# Patient Record
Sex: MALE | Race: WHITE | NOT HISPANIC OR LATINO
[De-identification: names, ages, dates, MRNs, and addresses within clinical notes are randomized per-mention and may not be internally consistent; named-entity substitution may affect disease eponyms.]

---

## 2018-06-11 ENCOUNTER — APPOINTMENT (OUTPATIENT)
Dept: RADIOLOGY | Facility: IMAGING CENTER | Age: 63
End: 2018-06-11
Payer: COMMERCIAL

## 2018-06-11 ENCOUNTER — TRANSCRIPTION ENCOUNTER (OUTPATIENT)
Age: 63
End: 2018-06-11

## 2018-06-11 ENCOUNTER — OUTPATIENT (OUTPATIENT)
Dept: OUTPATIENT SERVICES | Facility: HOSPITAL | Age: 63
LOS: 1 days | End: 2018-06-11
Payer: COMMERCIAL

## 2018-06-11 DIAGNOSIS — Z00.8 ENCOUNTER FOR OTHER GENERAL EXAMINATION: ICD-10-CM

## 2018-06-11 PROCEDURE — 73610 X-RAY EXAM OF ANKLE: CPT | Mod: 26,RT

## 2018-06-11 PROCEDURE — 73610 X-RAY EXAM OF ANKLE: CPT

## 2020-04-25 ENCOUNTER — MESSAGE (OUTPATIENT)
Age: 65
End: 2020-04-25

## 2020-05-06 ENCOUNTER — APPOINTMENT (OUTPATIENT)
Dept: DISASTER EMERGENCY | Facility: HOSPITAL | Age: 65
End: 2020-05-06

## 2020-05-07 LAB
SARS-COV-2 IGG SERPL IA-ACNC: <0.1 INDEX
SARS-COV-2 IGG SERPL QL IA: NEGATIVE

## 2022-10-19 ENCOUNTER — NON-APPOINTMENT (OUTPATIENT)
Age: 67
End: 2022-10-19

## 2022-12-14 ENCOUNTER — NON-APPOINTMENT (OUTPATIENT)
Age: 67
End: 2022-12-14

## 2023-01-05 ENCOUNTER — APPOINTMENT (OUTPATIENT)
Dept: PULMONOLOGY | Facility: CLINIC | Age: 68
End: 2023-01-05
Payer: COMMERCIAL

## 2023-01-05 VITALS
WEIGHT: 210 LBS | RESPIRATION RATE: 15 BRPM | SYSTOLIC BLOOD PRESSURE: 184 MMHG | TEMPERATURE: 96 F | BODY MASS INDEX: 31.83 KG/M2 | DIASTOLIC BLOOD PRESSURE: 97 MMHG | HEIGHT: 68 IN | OXYGEN SATURATION: 97 % | HEART RATE: 100 BPM

## 2023-01-05 VITALS — SYSTOLIC BLOOD PRESSURE: 138 MMHG | DIASTOLIC BLOOD PRESSURE: 76 MMHG

## 2023-01-05 DIAGNOSIS — M1A.0510 IDIOPATHIC CHRONIC GOUT, RIGHT HIP, W/OUT TOPHUS (TOPHI): ICD-10-CM

## 2023-01-05 DIAGNOSIS — M10.9 GOUT, UNSPECIFIED: ICD-10-CM

## 2023-01-05 PROCEDURE — 99204 OFFICE O/P NEW MOD 45 MIN: CPT | Mod: GC

## 2023-01-08 NOTE — REVIEW OF SYSTEMS
[EDS: ESS=____] : daytime somnolence: ESS=[unfilled] [Nasal Congestion] : nasal congestion [Snoring] : no snoring [Postnasal Drip] : no postnasal drip [Witnessed Apneas] : no witnessed apnea [Shortness Of Breath] : no shortness of breath [A.M. Dry Mouth] : no a.m. dry mouth [Nocturia] : no nocturia [Difficulty Initiating Sleep] : no difficulty falling asleep [Difficulty Maintaining Sleep] : no difficulty maintaining sleep [Lower Extremity Discomfort] : no lower extremity discomfort [Irresistible urge to move legs] : no irresistible urge to move legs because of lower extremity discomfort [LE discomfort relieved by movement] : lower extremity discomfort not relieved by movement [Late day/ Evening symptoms] : no late day/evening symptoms [Sleep Disturbances due to LE symptoms] : ~T no sleep disturbances due to lower extremity symptoms [Unusual Sleep Behavior] : no unusual sleep behavior [Hypersomnolence] : not sleeping much more than usual [Hypnogogic Hallucinations] : no hypnogogic hallucinations [Hypnopompic Hallucinations] : no hypnopompic hallucinations [Sleep Paralysis] : no sleep paralysis

## 2023-01-08 NOTE — ASSESSMENT
[FreeTextEntry1] : 67M hx of severe LENA (PSG 1/2010 AHI 31, T90 12.7%, currently on CPAP 11 ResMed AirSense 10 Elite with Vitera face mask) who presents as a new patient last seen in clinic in 2015. He reports no issues with the CPAP therapy for his LENA. He reports CPAP has had a tremendously positive impact on his life and that he uses his machine nightly. He goes to sleep at around 22:30 and wakes up at around 0630. No snoring, witnessed apneas, AM headaches, or AM dry mouth. He usually will fall asleep without difficulty and will wake up once per night to urinate. He denies daytime sleepiness and ESS in office today is 3. \par \par Assessment: Excellent response to CPAP therapy. AHI 1.9. Excellent compliance with therapy. Discussed potentially replacing patients CPAP machine including the pros and cons of the new machines and we decided together that the patient would continue using his current machine until it malfunctions. \par \par Plan:\par Will write prescriptions for new mask\par Encouraged patient to contact me if any issues with his current machine\par RTC in 1 year for follow up. Can also schedule televisit instead\par \par Repeat BP check after sitting in room quietly for 15 minutes and with appropriate blood pressure cuff size: 138/76- he will f/u with his primary MD for further bp management.

## 2023-01-08 NOTE — HISTORY OF PRESENT ILLNESS
[FreeTextEntry1] : 67M hx of severe LENA (PSG 1/2010 AHI 31, T90 12.7%, currently on CPAP 11 ResMed AirSense 10 Elite with Vitera face mask) who presents as a new patient last seen in clinic in 2015. He reports no issues with the CPAP therapy for his LENA. He reports CPAP has had a tremendously positive impact on his life and that he uses his machine religiously. He goes to sleep at around 22:30 and wakes up at around 0630. No snoring, witnessed apneas, AM headaches, or AM dry mouth. He usually will fall asleep without difficulty and will wake up once per night to urinate. He denies daytime sleepiness and ESS in office today is 3. \par \par Usage days 87/90 97%\par Average usage todal days 7 hours 11 minutes \par Average usage days used 7 hours 26 minutes\par AHI 1.9\par  \par \par \par \par \par \par \par  [Daytime Somnolence] : no daytime somnolence [ESS] : 3

## 2023-01-08 NOTE — PHYSICAL EXAM
[General Appearance - Well Developed] : well developed [Normal Appearance] : normal appearance [Well Groomed] : well groomed [Normal Conjunctiva] : the conjunctiva exhibited no abnormalities [Normal Oropharynx] : normal oropharynx [Neck Appearance] : the appearance of the neck was normal [Heart Rate And Rhythm] : heart rate was normal and rhythm regular [Heart Sounds] : normal S1 and S2 [Heart Sounds Gallop] : no gallops [Murmurs] : no murmurs [Heart Sounds Pericardial Friction Rub] : no pericardial rub [] : no respiratory distress [Respiration, Rhythm And Depth] : normal respiratory rhythm and effort [Exaggerated Use Of Accessory Muscles For Inspiration] : no accessory muscle use [Auscultation Breath Sounds / Voice Sounds] : lungs were clear to auscultation bilaterally [Abnormal Walk] : normal gait [Nail Clubbing] : no clubbing of the fingernails [Cyanosis, Localized] : no localized cyanosis [Petechial Hemorrhages (___cm)] : no petechial hemorrhages [Nail Splinter Hemorrhages] : no splinter hemorrhages of the nails [Skin Color & Pigmentation] : normal skin color and pigmentation [Oriented To Time, Place, And Person] : oriented to person, place, and time [Impaired Insight] : insight and judgment were intact [Affect] : the affect was normal [Memory Recent] : recent memory was not impaired [Mood] : the mood was normal [Memory Remote] : remote memory was not impaired [FreeTextEntry2] : No edema

## 2023-04-28 ENCOUNTER — APPOINTMENT (OUTPATIENT)
Dept: DERMATOLOGY | Facility: CLINIC | Age: 68
End: 2023-04-28

## 2023-05-31 ENCOUNTER — TRANSCRIPTION ENCOUNTER (OUTPATIENT)
Age: 68
End: 2023-05-31

## 2023-07-26 ENCOUNTER — APPOINTMENT (OUTPATIENT)
Dept: PULMONOLOGY | Facility: CLINIC | Age: 68
End: 2023-07-26

## 2023-08-03 ENCOUNTER — APPOINTMENT (OUTPATIENT)
Dept: PULMONOLOGY | Facility: CLINIC | Age: 68
End: 2023-08-03
Payer: COMMERCIAL

## 2023-08-03 VITALS
TEMPERATURE: 98.6 F | WEIGHT: 214 LBS | HEART RATE: 85 BPM | RESPIRATION RATE: 15 BRPM | BODY MASS INDEX: 32.43 KG/M2 | DIASTOLIC BLOOD PRESSURE: 83 MMHG | HEIGHT: 68 IN | SYSTOLIC BLOOD PRESSURE: 145 MMHG | OXYGEN SATURATION: 94 %

## 2023-08-03 DIAGNOSIS — G47.33 OBSTRUCTIVE SLEEP APNEA (ADULT) (PEDIATRIC): ICD-10-CM

## 2023-08-03 PROCEDURE — 99213 OFFICE O/P EST LOW 20 MIN: CPT

## 2023-08-03 RX ORDER — CHROMIUM 200 MCG
TABLET ORAL
Refills: 0 | Status: ACTIVE | COMMUNITY

## 2023-08-03 RX ORDER — BERBERINE CHLOR/SEAWEED/CHROM 500-250 MG
CAPSULE ORAL
Refills: 0 | Status: ACTIVE | COMMUNITY

## 2023-08-03 NOTE — HISTORY OF PRESENT ILLNESS
[Awakening With Dry Mouth] : awakening with dry mouth [FreeTextEntry1] : 67 year-old male following up after receiving 5 year-old machine replacement for SEVERE LENA (AHI 31.8) on CPAP 11 cmH2O.  Last visit 1/2023.  2010:  SLEEP STUDIES DX PSG 1/28/10:  AHI 31.8.  T<90 12.7%.  Lowest SatO2 71%. Titration 3/16/10:  AHI 7.1 on CPAP 11 cmH2O  2023 TX:  AirSense 11 AutoSet setup 5/26/2023 by [facility], using a full-face mask. COMPLIANCE:  Nightly BENEFIT:  Not drowsy, and sleep throughout the night.  Denies apnea-related symptoms. Occasional dry mouth.  Vitera mask whistles, switched to Mirage Quattro. DATA: 100% use for 7-hrs 50-mins. AHI 3.2 on CPAP 11 cmH2O.  Mask leak 14.2 L/min. SLEEPS:  7-hours between 10pm-5am.  No naps.  SOL instantly. 0-2 brief awakenings to urinate.  No INTERIM CHANGES in health or weight.  Added supplements: Red Yeast Rice, Turmeric, Vitamin D. [Snoring] : no snoring [Witnessed Apneas] : no witnessed sleep apnea [Frequent Nocturnal Awakening] : no nocturnal awakening [Unintentional Sleep while Active] : no unintentional sleep while active [Unintentional Sleep While Inactive] : no unintentional sleep while inactive [Awakes Unrefreshed] : does not awake unrefreshed [Awakes with Headache] : no headache upon awakening [Recent  Weight Gain] : no recent weight gain [Daytime Somnolence] : no daytime somnolence [DIS] : no DIS [DMS] : no DMS [Unusual Sleep Behavior] : no unusual sleep behavior [Lower Extremity Discomfort] : no lower extremity discomfort in evening or at bedtime [ESS] : 3

## 2023-08-03 NOTE — ASSESSMENT
[FreeTextEntry1] : 67 year old HAMILTON FELICIANO   complies well and benefits from CPAP therapy since 2010 for:   	Severe obstructive sleep apnea  (AHI 31.8) on CPAP 11 cmH2O using a full-face mask.  Drowsiness and sleep fragmentation has resolved with CPAP.  [place] Sleepiness Scale score is 3.   AirSense 11 AutoSet downloaded data was reviewed with patient:   Excellent CPAP compliance (100% of days, 100% for >4-hrs, averaging 7-hrs 50-mins.),   excellent therapeutic response (AHI 3.2), acceptable mask leak (14.2 L/min).          [facility] PAP supply renewal to continue therapy at the current settings. Patient will consider an in-office mask fitting. Annual follow-up, sooner if needed.

## 2023-08-03 NOTE — PHYSICAL EXAM
[Normal Appearance] : normal appearance [Well Groomed] : well groomed [General Appearance - In No Acute Distress] : no acute distress [Normal Conjunctiva] : the conjunctiva exhibited no abnormalities [Neck Appearance] : the appearance of the neck was normal [Heart Rate And Rhythm] : heart rate was normal and rhythm regular [Murmurs] : no murmurs [] : no respiratory distress [Auscultation Breath Sounds / Voice Sounds] : lungs were clear to auscultation bilaterally [Involuntary Movements] : no involuntary movements were seen [FreeTextEntry2] : no edema [No Focal Deficits] : no focal deficits [Affect] : the affect was normal

## 2025-04-04 ENCOUNTER — APPOINTMENT (OUTPATIENT)
Dept: INTERNAL MEDICINE | Facility: CLINIC | Age: 70
End: 2025-04-04

## 2025-04-18 ENCOUNTER — TRANSCRIPTION ENCOUNTER (OUTPATIENT)
Age: 70
End: 2025-04-18

## 2025-07-28 ENCOUNTER — APPOINTMENT (OUTPATIENT)
Dept: INTERNAL MEDICINE | Facility: CLINIC | Age: 70
End: 2025-07-28

## 2025-09-09 ENCOUNTER — NON-APPOINTMENT (OUTPATIENT)
Age: 70
End: 2025-09-09

## 2025-09-10 ENCOUNTER — APPOINTMENT (OUTPATIENT)
Dept: PULMONOLOGY | Facility: CLINIC | Age: 70
End: 2025-09-10

## 2025-09-10 ENCOUNTER — APPOINTMENT (OUTPATIENT)
Dept: PULMONOLOGY | Facility: CLINIC | Age: 70
End: 2025-09-10
Payer: COMMERCIAL

## 2025-09-10 DIAGNOSIS — G47.33 OBSTRUCTIVE SLEEP APNEA (ADULT) (PEDIATRIC): ICD-10-CM

## 2025-09-10 PROCEDURE — G2211 COMPLEX E/M VISIT ADD ON: CPT | Mod: 95

## 2025-09-10 PROCEDURE — 99203 OFFICE O/P NEW LOW 30 MIN: CPT | Mod: 95,GC

## 2025-09-18 ENCOUNTER — APPOINTMENT (OUTPATIENT)
Dept: FAMILY MEDICINE | Facility: CLINIC | Age: 70
End: 2025-09-18
Payer: COMMERCIAL

## 2025-09-18 VITALS
HEART RATE: 80 BPM | DIASTOLIC BLOOD PRESSURE: 102 MMHG | SYSTOLIC BLOOD PRESSURE: 193 MMHG | WEIGHT: 220 LBS | OXYGEN SATURATION: 96 % | BODY MASS INDEX: 33.34 KG/M2 | RESPIRATION RATE: 15 BRPM | HEIGHT: 68 IN | TEMPERATURE: 98.8 F

## 2025-09-18 VITALS — DIASTOLIC BLOOD PRESSURE: 101 MMHG | SYSTOLIC BLOOD PRESSURE: 167 MMHG

## 2025-09-18 DIAGNOSIS — E78.2 MIXED HYPERLIPIDEMIA: ICD-10-CM

## 2025-09-18 DIAGNOSIS — M10.9 GOUT, UNSPECIFIED: ICD-10-CM

## 2025-09-18 DIAGNOSIS — E66.811 OBESITY, CLASS 1: ICD-10-CM

## 2025-09-18 DIAGNOSIS — G47.33 OBSTRUCTIVE SLEEP APNEA (ADULT) (PEDIATRIC): ICD-10-CM

## 2025-09-18 DIAGNOSIS — Z12.5 ENCOUNTER FOR SCREENING FOR MALIGNANT NEOPLASM OF PROSTATE: ICD-10-CM

## 2025-09-18 DIAGNOSIS — Z12.11 ENCOUNTER FOR SCREENING FOR MALIGNANT NEOPLASM OF COLON: ICD-10-CM

## 2025-09-18 DIAGNOSIS — I10 ESSENTIAL (PRIMARY) HYPERTENSION: ICD-10-CM

## 2025-09-18 DIAGNOSIS — R73.01 IMPAIRED FASTING GLUCOSE: ICD-10-CM

## 2025-09-18 LAB
25(OH)D3 SERPL-MCNC: 49.6 NG/ML
ALBUMIN SERPL ELPH-MCNC: 4.9 G/DL
ALP BLD-CCNC: 87 U/L
ALT SERPL-CCNC: 25 U/L
ANION GAP SERPL CALC-SCNC: 17 MMOL/L
AST SERPL-CCNC: 28 U/L
BASOPHILS # BLD AUTO: 0.1 K/UL
BASOPHILS NFR BLD AUTO: 1 %
BILIRUB SERPL-MCNC: 0.6 MG/DL
BUN SERPL-MCNC: 13 MG/DL
CALCIUM SERPL-MCNC: 10.2 MG/DL
CHLORIDE SERPL-SCNC: 103 MMOL/L
CHOLEST SERPL-MCNC: 214 MG/DL
CO2 SERPL-SCNC: 25 MMOL/L
CREAT SERPL-MCNC: 0.92 MG/DL
EGFRCR SERPLBLD CKD-EPI 2021: 90 ML/MIN/1.73M2
EOSINOPHIL # BLD AUTO: 0.24 K/UL
EOSINOPHIL NFR BLD AUTO: 2.4 %
ESTIMATED AVERAGE GLUCOSE: 143 MG/DL
GLUCOSE SERPL-MCNC: 127 MG/DL
HBA1C MFR BLD HPLC: 6.6 %
HCT VFR BLD CALC: 48.4 %
HDLC SERPL-MCNC: 42 MG/DL
HGB BLD-MCNC: 15.8 G/DL
IMM GRANULOCYTES NFR BLD AUTO: 0.4 %
LDLC SERPL-MCNC: 141 MG/DL
LYMPHOCYTES # BLD AUTO: 2.2 K/UL
LYMPHOCYTES NFR BLD AUTO: 21.7 %
MAN DIFF?: NORMAL
MCHC RBC-ENTMCNC: 29.2 PG
MCHC RBC-ENTMCNC: 32.6 G/DL
MCV RBC AUTO: 89.5 FL
MONOCYTES # BLD AUTO: 0.75 K/UL
MONOCYTES NFR BLD AUTO: 7.4 %
NEUTROPHILS # BLD AUTO: 6.83 K/UL
NEUTROPHILS NFR BLD AUTO: 67.1 %
NONHDLC SERPL-MCNC: 173 MG/DL
PLATELET # BLD AUTO: 286 K/UL
POTASSIUM SERPL-SCNC: 5 MMOL/L
PROT SERPL-MCNC: 8 G/DL
PSA SERPL-MCNC: 3.83 NG/ML
RBC # BLD: 5.41 M/UL
RBC # FLD: 13.4 %
SODIUM SERPL-SCNC: 145 MMOL/L
TRIGL SERPL-MCNC: 172 MG/DL
TSH SERPL-ACNC: 3.33 UIU/ML
URATE SERPL-MCNC: 4.5 MG/DL
WBC # FLD AUTO: 10.16 K/UL

## 2025-09-18 PROCEDURE — 99387 INIT PM E/M NEW PAT 65+ YRS: CPT

## 2025-09-18 PROCEDURE — 99204 OFFICE O/P NEW MOD 45 MIN: CPT | Mod: 25
